# Patient Record
Sex: FEMALE | Race: WHITE | NOT HISPANIC OR LATINO | Employment: OTHER | URBAN - METROPOLITAN AREA
[De-identification: names, ages, dates, MRNs, and addresses within clinical notes are randomized per-mention and may not be internally consistent; named-entity substitution may affect disease eponyms.]

---

## 2019-08-03 ENCOUNTER — HOSPITAL ENCOUNTER (EMERGENCY)
Facility: MEDICAL CENTER | Age: 79
End: 2019-08-04
Attending: EMERGENCY MEDICINE
Payer: MEDICARE

## 2019-08-03 DIAGNOSIS — K52.9 GASTROENTERITIS: ICD-10-CM

## 2019-08-03 PROCEDURE — 99285 EMERGENCY DEPT VISIT HI MDM: CPT

## 2019-08-03 RX ORDER — SODIUM CHLORIDE 9 MG/ML
1000 INJECTION, SOLUTION INTRAVENOUS ONCE
Status: COMPLETED | OUTPATIENT
Start: 2019-08-04 | End: 2019-08-04

## 2019-08-03 RX ORDER — LOPERAMIDE HYDROCHLORIDE 2 MG/1
2 CAPSULE ORAL ONCE
Status: COMPLETED | OUTPATIENT
Start: 2019-08-04 | End: 2019-08-04

## 2019-08-03 RX ORDER — ONDANSETRON 2 MG/ML
4 INJECTION INTRAMUSCULAR; INTRAVENOUS ONCE
Status: COMPLETED | OUTPATIENT
Start: 2019-08-04 | End: 2019-08-04

## 2019-08-03 ASSESSMENT — ENCOUNTER SYMPTOMS
VOMITING: 1
DIARRHEA: 1
ABDOMINAL PAIN: 0
NAUSEA: 1

## 2019-08-04 ENCOUNTER — APPOINTMENT (OUTPATIENT)
Dept: RADIOLOGY | Facility: MEDICAL CENTER | Age: 79
End: 2019-08-04
Attending: EMERGENCY MEDICINE
Payer: MEDICARE

## 2019-08-04 VITALS
OXYGEN SATURATION: 95 % | RESPIRATION RATE: 20 BRPM | SYSTOLIC BLOOD PRESSURE: 163 MMHG | BODY MASS INDEX: 30.11 KG/M2 | HEIGHT: 64 IN | WEIGHT: 176.37 LBS | DIASTOLIC BLOOD PRESSURE: 68 MMHG | HEART RATE: 90 BPM | TEMPERATURE: 97 F

## 2019-08-04 LAB
ALBUMIN SERPL BCP-MCNC: 4.2 G/DL (ref 3.2–4.9)
ALBUMIN/GLOB SERPL: 1.4 G/DL
ALP SERPL-CCNC: 85 U/L (ref 30–99)
ALT SERPL-CCNC: 25 U/L (ref 2–50)
ANION GAP SERPL CALC-SCNC: 10 MMOL/L (ref 0–11.9)
AST SERPL-CCNC: 26 U/L (ref 12–45)
BASOPHILS # BLD AUTO: 0.4 % (ref 0–1.8)
BASOPHILS # BLD: 0.07 K/UL (ref 0–0.12)
BILIRUB SERPL-MCNC: 0.7 MG/DL (ref 0.1–1.5)
BUN SERPL-MCNC: 15 MG/DL (ref 8–22)
CALCIUM SERPL-MCNC: 9.7 MG/DL (ref 8.4–10.2)
CHLORIDE SERPL-SCNC: 104 MMOL/L (ref 96–112)
CO2 SERPL-SCNC: 21 MMOL/L (ref 20–33)
CREAT SERPL-MCNC: 1.04 MG/DL (ref 0.5–1.4)
EOSINOPHIL # BLD AUTO: 0.09 K/UL (ref 0–0.51)
EOSINOPHIL NFR BLD: 0.5 % (ref 0–6.9)
ERYTHROCYTE [DISTWIDTH] IN BLOOD BY AUTOMATED COUNT: 43.4 FL (ref 35.9–50)
GLOBULIN SER CALC-MCNC: 3 G/DL (ref 1.9–3.5)
GLUCOSE SERPL-MCNC: 143 MG/DL (ref 65–99)
HCT VFR BLD AUTO: 42.2 % (ref 37–47)
HGB BLD-MCNC: 14.4 G/DL (ref 12–16)
IMM GRANULOCYTES # BLD AUTO: 0.34 K/UL (ref 0–0.11)
IMM GRANULOCYTES NFR BLD AUTO: 1.7 % (ref 0–0.9)
LYMPHOCYTES # BLD AUTO: 1.51 K/UL (ref 1–4.8)
LYMPHOCYTES NFR BLD: 7.6 % (ref 22–41)
MCH RBC QN AUTO: 30.7 PG (ref 27–33)
MCHC RBC AUTO-ENTMCNC: 34.1 G/DL (ref 33.6–35)
MCV RBC AUTO: 90 FL (ref 81.4–97.8)
MONOCYTES # BLD AUTO: 0.6 K/UL (ref 0–0.85)
MONOCYTES NFR BLD AUTO: 3 % (ref 0–13.4)
NEUTROPHILS # BLD AUTO: 17.15 K/UL (ref 2–7.15)
NEUTROPHILS NFR BLD: 86.8 % (ref 44–72)
NRBC # BLD AUTO: 0 K/UL
NRBC BLD-RTO: 0 /100 WBC
PLATELET # BLD AUTO: 329 K/UL (ref 164–446)
PMV BLD AUTO: 9.1 FL (ref 9–12.9)
POTASSIUM SERPL-SCNC: 4.1 MMOL/L (ref 3.6–5.5)
PROT SERPL-MCNC: 7.2 G/DL (ref 6–8.2)
RBC # BLD AUTO: 4.69 M/UL (ref 4.2–5.4)
SODIUM SERPL-SCNC: 135 MMOL/L (ref 135–145)
WBC # BLD AUTO: 19.8 K/UL (ref 4.8–10.8)

## 2019-08-04 PROCEDURE — 74177 CT ABD & PELVIS W/CONTRAST: CPT

## 2019-08-04 PROCEDURE — 80053 COMPREHEN METABOLIC PANEL: CPT

## 2019-08-04 PROCEDURE — 700111 HCHG RX REV CODE 636 W/ 250 OVERRIDE (IP): Performed by: EMERGENCY MEDICINE

## 2019-08-04 PROCEDURE — 700105 HCHG RX REV CODE 258: Performed by: EMERGENCY MEDICINE

## 2019-08-04 PROCEDURE — 700117 HCHG RX CONTRAST REV CODE 255: Performed by: EMERGENCY MEDICINE

## 2019-08-04 PROCEDURE — 96374 THER/PROPH/DIAG INJ IV PUSH: CPT

## 2019-08-04 PROCEDURE — 700102 HCHG RX REV CODE 250 W/ 637 OVERRIDE(OP): Performed by: EMERGENCY MEDICINE

## 2019-08-04 PROCEDURE — 36415 COLL VENOUS BLD VENIPUNCTURE: CPT

## 2019-08-04 PROCEDURE — 85025 COMPLETE CBC W/AUTO DIFF WBC: CPT

## 2019-08-04 PROCEDURE — A9270 NON-COVERED ITEM OR SERVICE: HCPCS | Performed by: EMERGENCY MEDICINE

## 2019-08-04 RX ORDER — LACTOBACILLUS ACIDOPHILUS 500MM CELL
1 CAPSULE ORAL DAILY
Qty: 10 CAPSULE | Refills: 0 | Status: SHIPPED | OUTPATIENT
Start: 2019-08-04 | End: 2019-08-14

## 2019-08-04 RX ORDER — METRONIDAZOLE 500 MG/1
500 TABLET ORAL 3 TIMES DAILY
Qty: 21 TAB | Refills: 0 | Status: SHIPPED | OUTPATIENT
Start: 2019-08-04 | End: 2019-08-11

## 2019-08-04 RX ORDER — ONDANSETRON 4 MG/1
4 TABLET, ORALLY DISINTEGRATING ORAL ONCE
Qty: 10 TAB | Refills: 0 | Status: SHIPPED | OUTPATIENT
Start: 2019-08-04 | End: 2019-08-04 | Stop reason: SDUPTHER

## 2019-08-04 RX ORDER — METRONIDAZOLE 500 MG/1
500 TABLET ORAL ONCE
Status: COMPLETED | OUTPATIENT
Start: 2019-08-04 | End: 2019-08-04

## 2019-08-04 RX ORDER — ONDANSETRON 4 MG/1
4 TABLET, ORALLY DISINTEGRATING ORAL EVERY 6 HOURS PRN
Qty: 10 TAB | Refills: 0 | Status: SHIPPED | OUTPATIENT
Start: 2019-08-04

## 2019-08-04 RX ADMIN — ONDANSETRON 4 MG: 2 INJECTION INTRAMUSCULAR; INTRAVENOUS at 00:17

## 2019-08-04 RX ADMIN — IOHEXOL 100 ML: 350 INJECTION, SOLUTION INTRAVENOUS at 02:00

## 2019-08-04 RX ADMIN — SODIUM CHLORIDE 1000 ML: 9 INJECTION, SOLUTION INTRAVENOUS at 00:17

## 2019-08-04 RX ADMIN — LOPERAMIDE HYDROCHLORIDE 2 MG: 2 CAPSULE ORAL at 00:18

## 2019-08-04 RX ADMIN — METRONIDAZOLE 500 MG: 500 TABLET, FILM COATED ORAL at 02:11

## 2019-08-04 NOTE — ED TRIAGE NOTES
Patient BiB  for n/v/d since this evening. She states that everyone in her household has had the same symptoms

## 2019-08-04 NOTE — DISCHARGE INSTRUCTIONS
Your symptoms are most consistent with viral gastroenteritis.  Your white count here was significantly elevated and therefore we checked a CT which was normal.  If your symptoms worsen despite the medicine that we are sending you home with, you develop fever severe abdominal pain cannot stop vomiting, develop chest pain or shortness of breath or have any other concerns please return to the emergency department.

## 2019-08-04 NOTE — ED PROVIDER NOTES
ED Provider Note    CHIEF COMPLAINT  Chief Complaint   Patient presents with   • Nausea/Vomiting/Diarrhea       HPI  Raysa Almanza is a 79 y.o. female who presents with nausea vomiting diarrhea.  Patient reports that her son-in-law and daughter had similar symptoms the last few days.  Patient reports that symptoms developed around 8 PM tonight.  She denies any associated abdominal pain.  Emesis is nonbloody nonbilious.  Diarrhea is nonbloody without any melena.  Patient reports that she is having a bowel movement around every hour.  She denies any recent antibiotic use, any exposure to C. difficile, or any recent travel.  Patient denies any fevers.  She is not in the nursing home. No sob or CP    REVIEW OF SYSTEMS  Review of Systems   Cardiovascular: Negative for chest pain.   Gastrointestinal: Positive for diarrhea, nausea and vomiting. Negative for abdominal pain.   Genitourinary: Negative for dysuria and urgency.     See HPI for further details. All other systems are negative.     PAST MEDICAL HISTORY   has a past medical history of Chickenpox, CVA (cerebral vascular accident) (HCC), Diphtheria, HLD (hyperlipidemia), and Tonsillitis.    SOCIAL HISTORY  Social History     Tobacco Use   • Smoking status: Former Smoker     Packs/day: 0.50     Years: 10.00     Pack years: 5.00     Types: Cigarettes     Last attempt to quit: 1987     Years since quittin.6   • Smokeless tobacco: Never Used   Substance and Sexual Activity   • Alcohol use: Yes     Comment: wine every 6 months    • Drug use: No   • Sexual activity: Not on file       SURGICAL HISTORY  patient denies any surgical history    CURRENT MEDICATIONS  Home Medications    **Home medications have not yet been reviewed for this encounter**         ALLERGIES  Allergies   Allergen Reactions   • Penicillins        PHYSICAL EXAM  Physical Exam   Constitutional: She is oriented to person, place, and time. She appears well-developed and well-nourished.    HENT:   Head: Normocephalic and atraumatic.   Eyes: Conjunctivae are normal.   Neck: Normal range of motion. Neck supple.   Cardiovascular: Normal rate and regular rhythm.   Pulmonary/Chest: Effort normal and breath sounds normal.   Abdominal: Soft. Bowel sounds are normal. She exhibits no distension. There is no tenderness. There is no rebound.   Neurological: She is alert and oriented to person, place, and time.   Skin: Skin is warm and dry. No rash noted.   Psychiatric: She has a normal mood and affect. Her behavior is normal.     Labs  Results for orders placed or performed during the hospital encounter of 08/03/19   CBC WITH DIFFERENTIAL   Result Value Ref Range    WBC 19.8 (H) 4.8 - 10.8 K/uL    RBC 4.69 4.20 - 5.40 M/uL    Hemoglobin 14.4 12.0 - 16.0 g/dL    Hematocrit 42.2 37.0 - 47.0 %    MCV 90.0 81.4 - 97.8 fL    MCH 30.7 27.0 - 33.0 pg    MCHC 34.1 33.6 - 35.0 g/dL    RDW 43.4 35.9 - 50.0 fL    Platelet Count 329 164 - 446 K/uL    MPV 9.1 9.0 - 12.9 fL    Neutrophils-Polys 86.80 (H) 44.00 - 72.00 %    Lymphocytes 7.60 (L) 22.00 - 41.00 %    Monocytes 3.00 0.00 - 13.40 %    Eosinophils 0.50 0.00 - 6.90 %    Basophils 0.40 0.00 - 1.80 %    Immature Granulocytes 1.70 (H) 0.00 - 0.90 %    Nucleated RBC 0.00 /100 WBC    Neutrophils (Absolute) 17.15 (H) 2.00 - 7.15 K/uL    Lymphs (Absolute) 1.51 1.00 - 4.80 K/uL    Monos (Absolute) 0.60 0.00 - 0.85 K/uL    Eos (Absolute) 0.09 0.00 - 0.51 K/uL    Baso (Absolute) 0.07 0.00 - 0.12 K/uL    Immature Granulocytes (abs) 0.34 (H) 0.00 - 0.11 K/uL    NRBC (Absolute) 0.00 K/uL   CMP   Result Value Ref Range    Sodium 135 135 - 145 mmol/L    Potassium 4.1 3.6 - 5.5 mmol/L    Chloride 104 96 - 112 mmol/L    Co2 21 20 - 33 mmol/L    Anion Gap 10.0 0.0 - 11.9    Glucose 143 (H) 65 - 99 mg/dL    Bun 15 8 - 22 mg/dL    Creatinine 1.04 0.50 - 1.40 mg/dL    Calcium 9.7 8.4 - 10.2 mg/dL    AST(SGOT) 26 12 - 45 U/L    ALT(SGPT) 25 2 - 50 U/L    Alkaline Phosphatase 85 30 - 99  U/L    Total Bilirubin 0.7 0.1 - 1.5 mg/dL    Albumin 4.2 3.2 - 4.9 g/dL    Total Protein 7.2 6.0 - 8.2 g/dL    Globulin 3.0 1.9 - 3.5 g/dL    A-G Ratio 1.4 g/dL   ESTIMATED GFR   Result Value Ref Range    GFR If African American >60 >60 mL/min/1.73 m 2    GFR If Non  51 (A) >60 mL/min/1.73 m 2     Rads  CT-ABDOMEN-PELVIS WITH   Final Result      1.  No acute abdominal or pelvic findings.   2.  Two uncalcified nodules in the right lower lobe. Metastatic neoplasm would be a possibility.   3.  Colonic diverticulosis without diverticulitis.   4.  Hiatal hernia.            COURSE & MEDICAL DECISION MAKING  Pertinent Labs & Imaging studies reviewed. (See chart for details)  Patient here with chief complaint of nausea vomiting diarrhea without any abdominal tenderness or pain.  She has no associated chest pain shortness of breath to suggest cardiopulmonary diagnosis.  Check for electrolytes.  Given Zofran and loperamide for symptoms.  Patient without any immediate risk factors for C. Difficile.  Given my low clinical suspicion of this I have given a dose of loperamide.  Patient's white count is significantly elevated, given these findings to check CT for possible occult surgical pathology.  Patient CT is unremarkable without any obvious pathology.  Patient is feeling considerably better, she has not vomited since receiving Zofran in the emergency department.  Her diarrhea has resolved.  She denies any fevers or constitutional symptoms.  Her abdominal exam remains entirely benign.  I discussed admission versus discharge with patient, given that she appears so well I do believe that discharge and outpatient management is an acceptable course, I have offered admission for observation but given the patient feels considerably improved has a normal CT we have decided using shared decision-making to manage patient as an outpatient.  Will give a short course of Flagyl and Zofran.  Will cover also with  lactobacillus to ensure no abnormal bacterial overgrowth with the associated antibiotics.  I discussed return precautions in depth with both patient and her  and asked her to practice good hand hygiene and not handle the family's food until her symptoms have resolved.  The patient will return for worsening symptoms and is stable at the time of discharge. The patient verbalizes understanding and will comply.    FINAL IMPRESSION    1. Gastroenteritis               Electronically signed by: Steven Carvajal, 8/3/2019 11:43 PM

## 2019-08-04 NOTE — ED NOTES
Dc instructions and medications discussed with patient and  at bedside. All questions answered at this time. VSS. IV removed. Pt ambulated to lobby with steady gait.

## 2019-12-02 ENCOUNTER — HOSPITAL ENCOUNTER (EMERGENCY)
Facility: MEDICAL CENTER | Age: 79
End: 2019-12-02
Attending: EMERGENCY MEDICINE
Payer: MEDICARE

## 2019-12-02 VITALS
DIASTOLIC BLOOD PRESSURE: 78 MMHG | SYSTOLIC BLOOD PRESSURE: 144 MMHG | RESPIRATION RATE: 20 BRPM | HEART RATE: 79 BPM | HEIGHT: 64 IN | WEIGHT: 180.78 LBS | BODY MASS INDEX: 30.86 KG/M2 | TEMPERATURE: 98.1 F | OXYGEN SATURATION: 95 %

## 2019-12-02 DIAGNOSIS — T16.1XXA FOREIGN BODY OF RIGHT EAR, INITIAL ENCOUNTER: ICD-10-CM

## 2019-12-02 PROCEDURE — 99284 EMERGENCY DEPT VISIT MOD MDM: CPT

## 2019-12-02 PROCEDURE — 69200 CLEAR OUTER EAR CANAL: CPT

## 2019-12-02 RX ORDER — NEOMYCIN SULFATE, POLYMYXIN B SULFATE AND HYDROCORTISONE 10; 3.5; 1 MG/ML; MG/ML; [USP'U]/ML
4 SUSPENSION/ DROPS AURICULAR (OTIC) 4 TIMES DAILY
Qty: 8 ML | Refills: 0 | Status: SHIPPED | OUTPATIENT
Start: 2019-12-02 | End: 2019-12-07

## 2019-12-02 NOTE — ED PROVIDER NOTES
ED Provider Note    CHIEF COMPLAINT  Chief Complaint   Patient presents with   • Aspiration   • Ear Pain     foreign body (piece of hearing aide) in R ear for 2 days       HPI  Raysa Almanza is a 79 y.o. female who presents to the emergency department the chief complaint of a foreign body in the right ear.  2 days ago the patient says that the end of her hearing aid came off in her right ear.  She is been unable to get it out.  She went to the [facility] and they were unable to remove it for the patient to the emergency department.  She denies any ear pain.  No drainage.  No fevers.  No change in hearing.    REVIEW OF SYSTEMS  See HPI for further details. All other systems are negative.     PAST MEDICAL HISTORY  Past Medical History:   Diagnosis Date   • Chickenpox    • CVA (cerebral vascular accident) (HCC)    • Diphtheria    • HLD (hyperlipidemia)    • Tonsillitis        FAMILY HISTORY  Family History   Problem Relation Age of Onset   • Cancer Sister        SOCIAL HISTORY  Social History     Socioeconomic History   • Marital status:      Spouse name: Not on file   • Number of children: Not on file   • Years of education: Not on file   • Highest education level: Not on file   Occupational History   • Not on file   Social Needs   • Financial resource strain: Not on file   • Food insecurity:     Worry: Not on file     Inability: Not on file   • Transportation needs:     Medical: Not on file     Non-medical: Not on file   Tobacco Use   • Smoking status: Former Smoker     Packs/day: 0.50     Years: 10.00     Pack years: 5.00     Types: Cigarettes     Last attempt to quit: 1987     Years since quittin.9   • Smokeless tobacco: Never Used   Substance and Sexual Activity   • Alcohol use: Yes     Comment: wine every 6 months    • Drug use: No   • Sexual activity: Not on file   Lifestyle   • Physical activity:     Days per week: Not on file     Minutes per session: Not on file   • Stress: Not on  "file   Relationships   • Social connections:     Talks on phone: Not on file     Gets together: Not on file     Attends Judaism service: Not on file     Active member of club or organization: Not on file     Attends meetings of clubs or organizations: Not on file     Relationship status: Not on file   • Intimate partner violence:     Fear of current or ex partner: Not on file     Emotionally abused: Not on file     Physically abused: Not on file     Forced sexual activity: Not on file   Other Topics Concern   • Not on file   Social History Narrative   • Not on file       SURGICAL HISTORY  History reviewed. No pertinent surgical history.    CURRENT MEDICATIONS  Home Medications    **Home medications have not yet been reviewed for this encounter**         ALLERGIES  Allergies   Allergen Reactions   • Ciprofloxacin      thrush   • Penicillins        PHYSICAL EXAM  VITAL SIGNS: /78   Pulse 79   Temp 36.7 °C (98.1 °F)   Resp 20   Ht 1.626 m (5' 4\")   Wt 82 kg (180 lb 12.4 oz)   SpO2 95%   BMI 31.03 kg/m²   Constitutional:  Well developed, Well nourished, No acute distress, Non-toxic appearance.   HENT: Normal cephalic, atraumatic, there is a clear plastic piece visualized in the right external auditory canal.  Deep in the canal adjacent to the tympanic membrane.  Skin: Warm, Dry, No erythema, No rash.   Extremities: Intact distal pulses, No edema, No tenderness, No cyanosis, No clubbing.   Neurologic: Alert & oriented x 3, Normal motor function, Normal sensory function, No focal deficits noted.   Psychiatric: Affect normal, Judgment normal, Mood normal.     RADIOLOGY/PROCEDURES  Foreign Body Removal Procedure Note    Indication: Right ear foreign body    Procedure:  Local anesthesia over the foreign body site was not indicate.  The foreign body was then removed using mosquito forcep and had the appearance of plastic.  After the procedure wound dressing was not necessary.     The patient tolerated the " procedure well.    Complications: None          COURSE & MEDICAL DECISION MAKING  Pertinent Labs & Imaging studies reviewed. (See chart for details)    Patient presents today with a right ear foreign body.  This is removed as above.  Examination after removal demonstrated some erythema in the external auditory canal.  The patient will be treated with Cortisporin otic suspension to hopefully prevent any infection.  Patient is discharged home in improved condition.    The patient will return for new or worsening symptoms and is stable at the time of discharge.    The patient is referred to a primary physician for blood pressure management, diabetic screening, and for all other preventative health concerns.    DISPOSITION:  Patient will be discharged home in stable condition.    FOLLOW UP:  Nirali Gilbert A.P.R.NSantos  [address] [facility] [address]  [phone]    Schedule an appointment as soon as possible for a visit       [facility], Emergency Dept  [address] [facility] [id number] [phone]    If symptoms worsen      OUTPATIENT MEDICATIONS:  New Prescriptions    NEOMYCIN-POLYMYXIN-HC (PEDIOTIC HC) 3.5-07175-8 SUSPENSION    Place 4 Drops in ear 4 times a day for 5 days.         FINAL IMPRESSION  1. Foreign body of right ear, initial encounter            Electronically signed by: Yusuf Sherman, 12/2/2019 11:21 AM

## 2019-12-02 NOTE — ED TRIAGE NOTES
"Chief Complaint   Patient presents with   • Aspiration   • Ear Pain     foreign body (piece of hearing aide) in R ear for 2 days     /78   Pulse 79   Temp 36.7 °C (98.1 °F)   Resp 20   Ht 1.626 m (5' 4\")   Wt 82 kg (180 lb 12.4 oz)   SpO2 95%   BMI 31.03 kg/m²     "

## 2020-02-21 NOTE — ED NOTES
Notified patient of need for stool sample.    [>50% of Time Spent on Counseling for ____] : Greater than 50% of the encounter time was spent on counseling for [unfilled] [Time Spent: ___ minutes] : I have spent [unfilled] minutes of face to face time with the patient

## 2021-01-14 DIAGNOSIS — Z23 NEED FOR VACCINATION: ICD-10-CM

## 2021-06-01 ENCOUNTER — RECORDS - HEALTHEAST (OUTPATIENT)
Dept: ADMINISTRATIVE | Facility: CLINIC | Age: 81
End: 2021-06-01

## 2021-08-02 ENCOUNTER — HOSPITAL ENCOUNTER (OUTPATIENT)
Dept: RADIOLOGY | Facility: MEDICAL CENTER | Age: 81
End: 2021-08-02
Attending: NURSE PRACTITIONER
Payer: MEDICARE

## 2021-08-02 DIAGNOSIS — R05.9 COUGH: ICD-10-CM

## 2021-08-02 DIAGNOSIS — R12 HEARTBURN: ICD-10-CM

## 2021-08-02 PROCEDURE — 700117 HCHG RX CONTRAST REV CODE 255: Performed by: NURSE PRACTITIONER

## 2021-08-02 PROCEDURE — 74220 X-RAY XM ESOPHAGUS 1CNTRST: CPT

## 2021-08-02 RX ADMIN — BARIUM SULFATE 700 MG: 700 TABLET ORAL at 14:45

## 2023-02-03 ENCOUNTER — HOSPITAL ENCOUNTER (EMERGENCY)
Facility: MEDICAL CENTER | Age: 83
End: 2023-02-03
Attending: EMERGENCY MEDICINE
Payer: MEDICARE

## 2023-02-03 VITALS
OXYGEN SATURATION: 97 % | WEIGHT: 186.73 LBS | BODY MASS INDEX: 31.88 KG/M2 | RESPIRATION RATE: 18 BRPM | TEMPERATURE: 97.3 F | HEART RATE: 62 BPM | HEIGHT: 64 IN | DIASTOLIC BLOOD PRESSURE: 77 MMHG | SYSTOLIC BLOOD PRESSURE: 142 MMHG

## 2023-02-03 DIAGNOSIS — T16.2XXA FOREIGN BODY OF LEFT EAR, INITIAL ENCOUNTER: ICD-10-CM

## 2023-02-03 PROCEDURE — 99284 EMERGENCY DEPT VISIT MOD MDM: CPT

## 2023-02-03 NOTE — ED PROVIDER NOTES
"ED Provider Note    CHIEF COMPLAINT  Chief Complaint   Patient presents with    Other     Hearing aid tip is stuck in L ear          HPI/ROS      Raysa Almanza is a 82 y.o. female who presents for concern of a hearing aid piece stuck in her ear.  She says it came off of her hearing aid and she thinks it is inside the ear.  No pain or drainage noted.    PAST MEDICAL HISTORY   has a past medical history of Chickenpox, CVA (cerebral vascular accident) (HCC), Diphtheria, HLD (hyperlipidemia), and Tonsillitis.    SURGICAL HISTORY  patient denies any surgical history    FAMILY HISTORY  Family History   Problem Relation Age of Onset    Cancer Sister        SOCIAL HISTORY  Social History     Tobacco Use    Smoking status: Former     Packs/day: 0.50     Years: 10.00     Pack years: 5.00     Types: Cigarettes     Quit date: 1987     Years since quittin.1    Smokeless tobacco: Never   Substance and Sexual Activity    Alcohol use: Yes     Comment: wine every 6 months     Drug use: No    Sexual activity: Not on file       CURRENT MEDICATIONS  Home Medications       Reviewed by Deirdre Sorto R.N. (Registered Nurse) on 23 at 1315  Med List Status: Partial     Medication Last Dose Status   albuterol (PROAIR HFA) 108 (90 BASE) MCG/ACT Aero Soln inhalation aerosol  Active   albuterol (PROVENTIL) 2.5mg/3ml Nebu Soln solution for nebulization  Active   Ibuprofen 200 MG Cap  Active   ondansetron (ZOFRAN ODT) 4 MG TABLET DISPERSIBLE  Active   oxybutynin (DITROPAN) 5 MG Tab  Active   potassium chloride ER (KLOR-CON) 10 MEQ tablet  Active   simvastatin (ZOCOR) 10 MG Tab  Active   spironolactone (ALDACTONE) 25 MG Tab  Active                    ALLERGIES  Allergies   Allergen Reactions    Ciprofloxacin      thrush    Penicillins        PHYSICAL EXAM  VITAL SIGNS: BP (!) 142/77   Pulse 62   Temp 36.3 °C (97.3 °F) (Temporal)   Resp 18   Ht 1.626 m (5' 4\")   Wt 84.7 kg (186 lb 11.7 oz)   SpO2 97%   BMI 32.05 " kg/m²    Constitutional: Alert in no apparent distress. Well apearing  HENT: Normocephalic, Atraumatic, Bilateral external ears normal. Nose normal.  TMs clear bilaterally no obvious foreign body seen in the left ear.  Eyes:  Conjunctiva normal, non-icteric.   Lungs: Non-labored respirations  Skin: Warm, Dry, No erythema, No rash.   Neurologic: Alert, Grossly non-focal.   Psychiatric: Affect normal, Judgment normal, Mood normal, Appears appropriate and not intoxicated.           COURSE & MEDICAL DECISION MAKING    ED Observation Status? No; Patient does not meet criteria for ED Observation.     INITIAL ASSESSMENT, COURSE AND PLAN  Care Narrative: This an 82-year-old female that presents concerned about a foreign body in her left ear.  On exam I do not appreciate any obvious foreign body.  The ear was irrigated to ensure there was no foreign body and on reevaluation there was still no foreign body present.  Given this I do think she can be discharged if she has further issues she can return for reevaluation.  She is agreeable to this plan.     The patient will return for new or worsening symptoms and is stable at the time of discharge. Patient was given return precautions. Patient and/or family member verbalizes understanding and will comply.    DISPOSITION:  Patient will be discharged home in stable condition.    FOLLOW UP:  WILLIAM Kumari  [address] [facility] [address]  [facility] [address] [phone]      As needed    [facility], Emergency Dept  [address]  [facility] [address] [phone]    Return for worsening pain, drainage or other concerns      OUTPATIENT MEDICATIONS:  New Prescriptions    No medications on file         FINAL DIAGNOSIS  1. Foreign body of left ear, initial encounter           Electronically signed by: Winsome Morales M.D., 2/3/2023 1:36 PM

## 2023-02-03 NOTE — ED TRIAGE NOTES
Pt comes in c/o hearing aid tip stuck in L ear  noticed it this morning when she went to put hearing aid in  has Hx of the same

## 2023-03-04 ENCOUNTER — HOSPITAL ENCOUNTER (EMERGENCY)
Facility: MEDICAL CENTER | Age: 83
End: 2023-03-04
Payer: MEDICARE

## 2023-03-04 VITALS
SYSTOLIC BLOOD PRESSURE: 144 MMHG | TEMPERATURE: 96.7 F | RESPIRATION RATE: 16 BRPM | DIASTOLIC BLOOD PRESSURE: 50 MMHG | HEART RATE: 72 BPM | OXYGEN SATURATION: 95 %

## 2023-03-04 PROCEDURE — 302449 STATCHG TRIAGE ONLY (STATISTIC)

## 2024-11-27 ASSESSMENT — ENCOUNTER SYMPTOMS
RESPIRATORY SYMPTOMS COMMENTS: YES
WHEEZING: 1
HEMOPTYSIS: 0
DYSPNEA AT REST: 0
SHORTNESS OF BREATH: 1
CHEST TIGHTNESS: 0

## 2024-12-03 ENCOUNTER — HOSPITAL ENCOUNTER (OUTPATIENT)
Dept: RADIOLOGY | Facility: MEDICAL CENTER | Age: 84
End: 2024-12-03
Attending: STUDENT IN AN ORGANIZED HEALTH CARE EDUCATION/TRAINING PROGRAM

## 2024-12-03 ENCOUNTER — OFFICE VISIT (OUTPATIENT)
Dept: SLEEP MEDICINE | Facility: MEDICAL CENTER | Age: 84
End: 2024-12-03
Attending: INTERNAL MEDICINE
Payer: MEDICARE

## 2024-12-03 VITALS
OXYGEN SATURATION: 96 % | BODY MASS INDEX: 29.88 KG/M2 | HEART RATE: 61 BPM | HEIGHT: 64 IN | SYSTOLIC BLOOD PRESSURE: 118 MMHG | DIASTOLIC BLOOD PRESSURE: 72 MMHG | WEIGHT: 175 LBS

## 2024-12-03 DIAGNOSIS — K21.9 GASTROESOPHAGEAL REFLUX DISEASE, UNSPECIFIED WHETHER ESOPHAGITIS PRESENT: ICD-10-CM

## 2024-12-03 DIAGNOSIS — R93.89 ABNORMAL CT OF THE CHEST: ICD-10-CM

## 2024-12-03 PROCEDURE — 99214 OFFICE O/P EST MOD 30 MIN: CPT | Performed by: INTERNAL MEDICINE

## 2024-12-03 PROCEDURE — 3074F SYST BP LT 130 MM HG: CPT | Performed by: INTERNAL MEDICINE

## 2024-12-03 PROCEDURE — 3078F DIAST BP <80 MM HG: CPT | Performed by: INTERNAL MEDICINE

## 2024-12-03 PROCEDURE — 99204 OFFICE O/P NEW MOD 45 MIN: CPT | Performed by: INTERNAL MEDICINE

## 2024-12-03 ASSESSMENT — ENCOUNTER SYMPTOMS
PHOTOPHOBIA: 0
WEAKNESS: 0
EYE REDNESS: 0
HEARTBURN: 1
SPEECH CHANGE: 0
VOMITING: 0
HEADACHES: 0
BLURRED VISION: 0
CONSTIPATION: 0
WEIGHT LOSS: 0
DOUBLE VISION: 0
CHILLS: 0
BACK PAIN: 0
SPUTUM PRODUCTION: 0
PALPITATIONS: 0
NECK PAIN: 0
SORE THROAT: 0
SHORTNESS OF BREATH: 1
CLAUDICATION: 0
STRIDOR: 0
WHEEZING: 1
EYE PAIN: 0
DIARRHEA: 0
NAUSEA: 0
PND: 0
ORTHOPNEA: 0
MYALGIAS: 0
SINUS PAIN: 0
TREMORS: 0
FOCAL WEAKNESS: 0
HEMOPTYSIS: 0
FALLS: 0
FEVER: 0
COUGH: 1
DEPRESSION: 0
EYE DISCHARGE: 0
DIZZINESS: 0
DIAPHORESIS: 0
ABDOMINAL PAIN: 0

## 2024-12-03 NOTE — PROGRESS NOTES
Chief Complaint   Patient presents with    New Patient     REF BY JOSEFINA WHITNEY FOR LUNG MASS, COUGH    Results     CT CHEST 11/11/24        HPI: This patient is a 84 y.o. female presenting for evaluation of abnormal CT chest. PMHx is significant for a remote hx of breast ca s/p surgical resection and chemotherapy but no XRT in 1989 per pt, bladder surgery for prolapse and GERD since her 20s. She is a former tobacco smoker with <5 pk year hx and quit in 1989. Sister suffered from breast Ca. No family hx of autoimmune disease. Pt does not drink ETOH. She denies chronic respiratory issues other than positional cough at night related to GERD. She was hospitalized once in 7/2016 for 3 days with a fairly diffuse PNA based on imaging from that time while vacationing with family in [place]. She was seen by Dr. Arriaza in October for f/u and a f/u CXR from 10/2016 was read as clear however there is an obvious abnormality in the NAHOMY and elevated R hemidiaphragm somewhat obscuring evaluation of the RLL. She later had a CT abdomen in 8/2019 on which there are clearly nodular opacities in the RLL that were never followed up. Most recently she was seen by PCP for cough and wheezing which improved with doxycycline and she denies any ongoing wheezing or cough outside of her nocturnal sxs. No f/c. No nt sweats or wt loss. CT ordered by her PCP from 11/11 showed nodular densities in RUL, NAHOMY and RLL most of which were present on CT from 7/2016 although some are new and certainly increased in size from 2016 and from visible areas on CT abd from 8/2019. She has some calcified LAD but not particularly enlarged.     Past Medical History:   Diagnosis Date    Chickenpox     CVA (cerebral vascular accident) (HCC)     Diphtheria     HLD (hyperlipidemia)     Tonsillitis        Social History     Socioeconomic History    Marital status:      Spouse name: Not on file    Number of children: Not on file    Years of education: Not on file     Highest education level: Not on file   Occupational History    Not on file   Tobacco Use    Smoking status: Former     Current packs/day: 0.00     Average packs/day: 0.5 packs/day for 10.0 years (5.0 ttl pk-yrs)     Types: Cigarettes     Start date: 1977     Quit date: 1987     Years since quittin.9    Smokeless tobacco: Never   Substance and Sexual Activity    Alcohol use: Yes     Comment: wine every 6 months     Drug use: No    Sexual activity: Not on file   Other Topics Concern    Not on file   Social History Narrative    Not on file     Social Drivers of Health     Financial Resource Strain: Not on file   Food Insecurity: Not on file   Transportation Needs: Not on file   Physical Activity: Not on file   Stress: Not on file   Social Connections: Not on file   Intimate Partner Violence: Not on file   Housing Stability: Not on file       Family History   Problem Relation Age of Onset    Cancer Sister        Current Outpatient Medications on File Prior to Visit   Medication Sig Dispense Refill    Ibuprofen 200 MG Cap Take  by mouth.      oxybutynin (DITROPAN) 5 MG Tab Take 5 mg by mouth 3 times a day.      potassium chloride ER (KLOR-CON) 10 MEQ tablet Take 10 mEq by mouth 2 times a day.      spironolactone (ALDACTONE) 25 MG Tab Take 25 mg by mouth every day.      albuterol (PROVENTIL) 2.5mg/3ml Nebu Soln solution for nebulization 2.5 mg by Nebulization route every four hours as needed for Shortness of Breath.      ondansetron (ZOFRAN ODT) 4 MG TABLET DISPERSIBLE Take 1 Tab by mouth every 6 hours as needed for Nausea. 10 Tab 0    albuterol (PROAIR HFA) 108 (90 BASE) MCG/ACT Aero Soln inhalation aerosol Inhale 2 Puffs by mouth every 6 hours as needed for Shortness of Breath.      simvastatin (ZOCOR) 10 MG Tab Take 10 mg by mouth every evening.       No current facility-administered medications on file prior to visit.       Allergies: Ciprofloxacin and Penicillins    ROS:   Review of Systems  "  Constitutional:  Negative for chills, diaphoresis, fever, malaise/fatigue and weight loss.   HENT:  Negative for congestion, ear discharge, ear pain, hearing loss, nosebleeds, sinus pain, sore throat and tinnitus.    Eyes:  Negative for blurred vision, double vision, photophobia, pain, discharge and redness.   Respiratory:  Positive for cough, shortness of breath and wheezing. Negative for hemoptysis, sputum production and stridor.    Cardiovascular:  Negative for chest pain, palpitations, orthopnea, claudication, leg swelling and PND.   Gastrointestinal:  Positive for heartburn. Negative for abdominal pain, constipation, diarrhea, nausea and vomiting.   Genitourinary:  Negative for dysuria and urgency.   Musculoskeletal:  Negative for back pain, falls, joint pain, myalgias and neck pain.   Skin:  Negative for itching and rash.   Neurological:  Negative for dizziness, tremors, speech change, focal weakness, weakness and headaches.   Endo/Heme/Allergies:  Negative for environmental allergies.   Psychiatric/Behavioral:  Negative for depression.        /72 (BP Location: Right arm, Patient Position: Sitting, BP Cuff Size: Adult)   Pulse 61   Ht 1.626 m (5' 4\")   Wt 79.4 kg (175 lb)   SpO2 96%     Physical Exam:  Physical Exam  Constitutional:       General: She is not in acute distress.     Appearance: Normal appearance. She is well-developed and normal weight.   HENT:      Head: Normocephalic and atraumatic.      Right Ear: External ear normal.      Left Ear: External ear normal.      Nose: Nose normal. No congestion.      Mouth/Throat:      Mouth: Mucous membranes are moist.      Pharynx: Oropharynx is clear. No oropharyngeal exudate.   Eyes:      General: No scleral icterus.     Extraocular Movements: Extraocular movements intact.      Conjunctiva/sclera: Conjunctivae normal.      Pupils: Pupils are equal, round, and reactive to light.   Neck:      Vascular: No JVD.      Trachea: No tracheal deviation. "   Cardiovascular:      Rate and Rhythm: Normal rate and regular rhythm.      Heart sounds: Normal heart sounds. No murmur heard.     No friction rub. No gallop.   Pulmonary:      Effort: Pulmonary effort is normal. No accessory muscle usage or respiratory distress.      Breath sounds: Normal breath sounds. No wheezing or rales.   Abdominal:      General: There is no distension.      Palpations: Abdomen is soft.      Tenderness: There is no abdominal tenderness.   Musculoskeletal:         General: No tenderness or deformity. Normal range of motion.      Cervical back: Normal range of motion and neck supple.      Right lower leg: No edema.      Left lower leg: No edema.   Lymphadenopathy:      Cervical: No cervical adenopathy.   Skin:     General: Skin is warm and dry.      Findings: No rash.      Nails: There is no clubbing.   Neurological:      Mental Status: She is alert and oriented to person, place, and time.      Cranial Nerves: No cranial nerve deficit.      Gait: Gait normal.   Psychiatric:         Behavior: Behavior normal.         PFTs as reviewed by me personally: none    Imaging as reviewed by me personally:as per hPI    Assessment:  1. Abnormal CT of the chest  Bronchoscopy    CT-CHEST (THORAX) W/O      2. Gastroesophageal reflux disease, unspecified whether esophagitis present            Plan:  1. This is chronic with most abnormalities present in 2016 but increasing in size and some new areas. Seems atypical for cancer given multifocal nature but also atypical for gERD w/aspiration. Chronic infx remains in the ddx as well. She is scheduled for PET at the end of this month and I am recommending diagnostic navigational bronchoscopy which pt is agreeable to.   2. She is already following lifestyle modifications to minimize mechanical reflux and I am not sure we could explain imaging abnormalities with GERd alone. See above.   Return in about 4 weeks (around 12/31/2024) for bronchoscopy results .

## 2024-12-04 ENCOUNTER — APPOINTMENT (OUTPATIENT)
Dept: ADMISSIONS | Facility: MEDICAL CENTER | Age: 84
End: 2024-12-04
Attending: INTERNAL MEDICINE
Payer: MEDICARE

## 2024-12-09 ENCOUNTER — PRE-ADMISSION TESTING (OUTPATIENT)
Dept: ADMISSIONS | Facility: MEDICAL CENTER | Age: 84
End: 2024-12-09
Attending: INTERNAL MEDICINE
Payer: MEDICARE

## 2024-12-09 VITALS — BODY MASS INDEX: 29.33 KG/M2 | WEIGHT: 171.8 LBS | HEIGHT: 64 IN

## 2024-12-09 DIAGNOSIS — Z01.810 PRE-OPERATIVE CARDIOVASCULAR EXAMINATION: ICD-10-CM

## 2024-12-09 DIAGNOSIS — Z01.812 PRE-OPERATIVE LABORATORY EXAMINATION: ICD-10-CM

## 2024-12-09 LAB
ANION GAP SERPL CALC-SCNC: 12 MMOL/L (ref 7–16)
BUN SERPL-MCNC: 12 MG/DL (ref 8–22)
CALCIUM SERPL-MCNC: 9.6 MG/DL (ref 8.4–10.2)
CHLORIDE SERPL-SCNC: 101 MMOL/L (ref 96–112)
CO2 SERPL-SCNC: 21 MMOL/L (ref 20–33)
CREAT SERPL-MCNC: 0.7 MG/DL (ref 0.5–1.4)
GFR SERPLBLD CREATININE-BSD FMLA CKD-EPI: 84 ML/MIN/1.73 M 2
GLUCOSE SERPL-MCNC: 100 MG/DL (ref 65–99)
POTASSIUM SERPL-SCNC: 4.1 MMOL/L (ref 3.6–5.5)
SODIUM SERPL-SCNC: 134 MMOL/L (ref 135–145)

## 2024-12-09 PROCEDURE — 93005 ELECTROCARDIOGRAM TRACING: CPT | Mod: TC

## 2024-12-09 PROCEDURE — 80048 BASIC METABOLIC PNL TOTAL CA: CPT

## 2024-12-09 PROCEDURE — 36415 COLL VENOUS BLD VENIPUNCTURE: CPT

## 2024-12-09 RX ORDER — ATENOLOL 50 MG/1
25 TABLET ORAL 2 TIMES DAILY
COMMUNITY

## 2024-12-09 RX ORDER — GABAPENTIN 100 MG/1
100 CAPSULE ORAL 3 TIMES DAILY PRN
COMMUNITY

## 2024-12-09 RX ORDER — LEVOTHYROXINE SODIUM 25 UG/1
1.5 TABLET ORAL
COMMUNITY

## 2024-12-09 NOTE — PREPROCEDURE INSTRUCTIONS
PreAdmit In person Appointment: Reviewed the Preparing for your procedure handout with patient. Patient instructed per pharmacy guidelines regarding taking, holding or contacting provider for instructions on regularly prescribed medications before surgery.     Confirmed with patient where to check in morning of surgery. Handouts/Brochures given to patient.    Testing appointment today 12/9.

## 2024-12-10 LAB — EKG IMPRESSION: NORMAL

## 2024-12-10 PROCEDURE — 93010 ELECTROCARDIOGRAM REPORT: CPT | Performed by: INTERNAL MEDICINE

## 2024-12-18 RX ORDER — LORATADINE 10 MG/1
10 TABLET ORAL DAILY
COMMUNITY

## 2024-12-19 ENCOUNTER — ANESTHESIA EVENT (OUTPATIENT)
Dept: SURGERY | Facility: MEDICAL CENTER | Age: 84
End: 2024-12-19
Payer: MEDICARE

## 2024-12-19 ENCOUNTER — APPOINTMENT (OUTPATIENT)
Dept: RADIOLOGY | Facility: MEDICAL CENTER | Age: 84
End: 2024-12-19
Attending: INTERNAL MEDICINE
Payer: MEDICARE

## 2024-12-19 ENCOUNTER — ANESTHESIA (OUTPATIENT)
Dept: SURGERY | Facility: MEDICAL CENTER | Age: 84
End: 2024-12-19
Payer: MEDICARE

## 2024-12-19 ENCOUNTER — HOSPITAL ENCOUNTER (OUTPATIENT)
Facility: MEDICAL CENTER | Age: 84
End: 2024-12-19
Attending: INTERNAL MEDICINE | Admitting: INTERNAL MEDICINE
Payer: MEDICARE

## 2024-12-19 VITALS
WEIGHT: 175.93 LBS | DIASTOLIC BLOOD PRESSURE: 63 MMHG | OXYGEN SATURATION: 98 % | SYSTOLIC BLOOD PRESSURE: 140 MMHG | BODY MASS INDEX: 30.2 KG/M2 | RESPIRATION RATE: 16 BRPM | HEART RATE: 69 BPM | TEMPERATURE: 97.3 F

## 2024-12-19 DIAGNOSIS — R93.89 ABNORMAL CT OF THE CHEST: ICD-10-CM

## 2024-12-19 LAB
GRAM STN SPEC: NORMAL
GRAM STN SPEC: NORMAL
PATHOLOGY CONSULT NOTE: NORMAL
SIGNIFICANT IND 70042: NORMAL
SIGNIFICANT IND 70042: NORMAL
SITE SITE: NORMAL
SITE SITE: NORMAL
SOURCE SOURCE: NORMAL
SOURCE SOURCE: NORMAL

## 2024-12-19 PROCEDURE — 31654 BRONCH EBUS IVNTJ PERPH LES: CPT | Performed by: INTERNAL MEDICINE

## 2024-12-19 PROCEDURE — 31624 DX BRONCHOSCOPE/LAVAGE: CPT | Performed by: INTERNAL MEDICINE

## 2024-12-19 PROCEDURE — 502714 HCHG ROBOTIC SURGERY SERVICES: Performed by: INTERNAL MEDICINE

## 2024-12-19 PROCEDURE — 160048 HCHG OR STATISTICAL LEVEL 1-5: Performed by: INTERNAL MEDICINE

## 2024-12-19 PROCEDURE — 87070 CULTURE OTHR SPECIMN AEROBIC: CPT | Mod: 91

## 2024-12-19 PROCEDURE — 160046 HCHG PACU - 1ST 60 MINS PHASE II: Performed by: INTERNAL MEDICINE

## 2024-12-19 PROCEDURE — 88112 CYTOPATH CELL ENHANCE TECH: CPT

## 2024-12-19 PROCEDURE — 160025 RECOVERY II MINUTES (STATS): Performed by: INTERNAL MEDICINE

## 2024-12-19 PROCEDURE — C1889 IMPLANT/INSERT DEVICE, NOC: HCPCS | Performed by: INTERNAL MEDICINE

## 2024-12-19 PROCEDURE — 71250 CT THORAX DX C-: CPT

## 2024-12-19 PROCEDURE — 88305 TISSUE EXAM BY PATHOLOGIST: CPT | Mod: 59

## 2024-12-19 PROCEDURE — 160035 HCHG PACU - 1ST 60 MINS PHASE I: Performed by: INTERNAL MEDICINE

## 2024-12-19 PROCEDURE — 700111 HCHG RX REV CODE 636 W/ 250 OVERRIDE (IP): Performed by: ANESTHESIOLOGY

## 2024-12-19 PROCEDURE — 700101 HCHG RX REV CODE 250: Performed by: INTERNAL MEDICINE

## 2024-12-19 PROCEDURE — 71045 X-RAY EXAM CHEST 1 VIEW: CPT

## 2024-12-19 PROCEDURE — 88333 PATH CONSLTJ SURG CYTO XM 1: CPT

## 2024-12-19 PROCEDURE — 88313 SPECIAL STAINS GROUP 2: CPT

## 2024-12-19 PROCEDURE — 160002 HCHG RECOVERY MINUTES (STAT): Performed by: INTERNAL MEDICINE

## 2024-12-19 PROCEDURE — 31629 BRONCHOSCOPY/NEEDLE BX EACH: CPT | Performed by: INTERNAL MEDICINE

## 2024-12-19 PROCEDURE — 160031 HCHG SURGERY MINUTES - 1ST 30 MINS LEVEL 5: Performed by: INTERNAL MEDICINE

## 2024-12-19 PROCEDURE — 160036 HCHG PACU - EA ADDL 30 MINS PHASE I: Performed by: INTERNAL MEDICINE

## 2024-12-19 PROCEDURE — C1887 CATHETER, GUIDING: HCPCS | Performed by: INTERNAL MEDICINE

## 2024-12-19 PROCEDURE — 160009 HCHG ANES TIME/MIN: Performed by: INTERNAL MEDICINE

## 2024-12-19 PROCEDURE — 700101 HCHG RX REV CODE 250: Performed by: ANESTHESIOLOGY

## 2024-12-19 PROCEDURE — 700105 HCHG RX REV CODE 258: Performed by: INTERNAL MEDICINE

## 2024-12-19 PROCEDURE — 31628 BRONCHOSCOPY/LUNG BX EACH: CPT | Performed by: INTERNAL MEDICINE

## 2024-12-19 PROCEDURE — 87205 SMEAR GRAM STAIN: CPT

## 2024-12-19 PROCEDURE — 160042 HCHG SURGERY MINUTES - EA ADDL 1 MIN LEVEL 5: Performed by: INTERNAL MEDICINE

## 2024-12-19 RX ORDER — SODIUM CHLORIDE, SODIUM LACTATE, POTASSIUM CHLORIDE, CALCIUM CHLORIDE 600; 310; 30; 20 MG/100ML; MG/100ML; MG/100ML; MG/100ML
INJECTION, SOLUTION INTRAVENOUS CONTINUOUS
Status: ACTIVE | OUTPATIENT
Start: 2024-12-19 | End: 2024-12-19

## 2024-12-19 RX ORDER — LIDOCAINE HYDROCHLORIDE 20 MG/ML
INJECTION, SOLUTION EPIDURAL; INFILTRATION; INTRACAUDAL; PERINEURAL PRN
Status: DISCONTINUED | OUTPATIENT
Start: 2024-12-19 | End: 2024-12-19 | Stop reason: SURG

## 2024-12-19 RX ORDER — DEXAMETHASONE SODIUM PHOSPHATE 4 MG/ML
INJECTION, SOLUTION INTRA-ARTICULAR; INTRALESIONAL; INTRAMUSCULAR; INTRAVENOUS; SOFT TISSUE PRN
Status: DISCONTINUED | OUTPATIENT
Start: 2024-12-19 | End: 2024-12-19 | Stop reason: SURG

## 2024-12-19 RX ORDER — ONDANSETRON 2 MG/ML
INJECTION INTRAMUSCULAR; INTRAVENOUS PRN
Status: DISCONTINUED | OUTPATIENT
Start: 2024-12-19 | End: 2024-12-19 | Stop reason: SURG

## 2024-12-19 RX ORDER — OXYCODONE HCL 5 MG/5 ML
10 SOLUTION, ORAL ORAL
Status: DISCONTINUED | OUTPATIENT
Start: 2024-12-19 | End: 2024-12-19 | Stop reason: HOSPADM

## 2024-12-19 RX ORDER — EPHEDRINE SULFATE 50 MG/ML
INJECTION, SOLUTION INTRAVENOUS PRN
Status: DISCONTINUED | OUTPATIENT
Start: 2024-12-19 | End: 2024-12-19 | Stop reason: SURG

## 2024-12-19 RX ORDER — HYDROMORPHONE HYDROCHLORIDE 1 MG/ML
0.1 INJECTION, SOLUTION INTRAMUSCULAR; INTRAVENOUS; SUBCUTANEOUS
Status: DISCONTINUED | OUTPATIENT
Start: 2024-12-19 | End: 2024-12-19 | Stop reason: HOSPADM

## 2024-12-19 RX ORDER — MEPERIDINE HYDROCHLORIDE 25 MG/ML
6.25 INJECTION INTRAMUSCULAR; INTRAVENOUS; SUBCUTANEOUS
Status: DISCONTINUED | OUTPATIENT
Start: 2024-12-19 | End: 2024-12-19 | Stop reason: HOSPADM

## 2024-12-19 RX ORDER — HYDROMORPHONE HYDROCHLORIDE 1 MG/ML
0.4 INJECTION, SOLUTION INTRAMUSCULAR; INTRAVENOUS; SUBCUTANEOUS
Status: DISCONTINUED | OUTPATIENT
Start: 2024-12-19 | End: 2024-12-19 | Stop reason: HOSPADM

## 2024-12-19 RX ORDER — ONDANSETRON 2 MG/ML
4 INJECTION INTRAMUSCULAR; INTRAVENOUS
Status: DISCONTINUED | OUTPATIENT
Start: 2024-12-19 | End: 2024-12-19 | Stop reason: HOSPADM

## 2024-12-19 RX ORDER — DIPHENHYDRAMINE HYDROCHLORIDE 50 MG/ML
12.5 INJECTION INTRAMUSCULAR; INTRAVENOUS
Status: DISCONTINUED | OUTPATIENT
Start: 2024-12-19 | End: 2024-12-19 | Stop reason: HOSPADM

## 2024-12-19 RX ORDER — OXYCODONE HCL 5 MG/5 ML
5 SOLUTION, ORAL ORAL
Status: DISCONTINUED | OUTPATIENT
Start: 2024-12-19 | End: 2024-12-19 | Stop reason: HOSPADM

## 2024-12-19 RX ORDER — HALOPERIDOL 5 MG/ML
1 INJECTION INTRAMUSCULAR
Status: DISCONTINUED | OUTPATIENT
Start: 2024-12-19 | End: 2024-12-19 | Stop reason: HOSPADM

## 2024-12-19 RX ORDER — HYDROMORPHONE HYDROCHLORIDE 1 MG/ML
0.2 INJECTION, SOLUTION INTRAMUSCULAR; INTRAVENOUS; SUBCUTANEOUS
Status: DISCONTINUED | OUTPATIENT
Start: 2024-12-19 | End: 2024-12-19 | Stop reason: HOSPADM

## 2024-12-19 RX ADMIN — ROCURONIUM BROMIDE 40 MG: 10 INJECTION, SOLUTION INTRAVENOUS at 12:55

## 2024-12-19 RX ADMIN — SODIUM CHLORIDE, SODIUM LACTATE, POTASSIUM CHLORIDE, AND CALCIUM CHLORIDE: .6; .31; .03; .02 INJECTION, SOLUTION INTRAVENOUS at 10:55

## 2024-12-19 RX ADMIN — ONDANSETRON 4 MG: 2 INJECTION INTRAMUSCULAR; INTRAVENOUS at 14:04

## 2024-12-19 RX ADMIN — FENTANYL CITRATE 50 MCG: 50 INJECTION, SOLUTION INTRAMUSCULAR; INTRAVENOUS at 12:54

## 2024-12-19 RX ADMIN — LIDOCAINE HYDROCHLORIDE 20 MG: 20 INJECTION, SOLUTION EPIDURAL; INFILTRATION; INTRACAUDAL; PERINEURAL at 12:54

## 2024-12-19 RX ADMIN — SUGAMMADEX 200 MG: 100 INJECTION, SOLUTION INTRAVENOUS at 14:02

## 2024-12-19 RX ADMIN — GLYCOPYRROLATE 0.2 MG: 0.2 INJECTION INTRAMUSCULAR; INTRAVENOUS at 13:20

## 2024-12-19 RX ADMIN — EPHEDRINE SULFATE 10 MG: 50 INJECTION, SOLUTION INTRAVENOUS at 13:20

## 2024-12-19 RX ADMIN — PROPOFOL 150 MG: 10 INJECTION, EMULSION INTRAVENOUS at 12:54

## 2024-12-19 RX ADMIN — DEXAMETHASONE SODIUM PHOSPHATE 4 MG: 4 INJECTION INTRA-ARTICULAR; INTRALESIONAL; INTRAMUSCULAR; INTRAVENOUS; SOFT TISSUE at 13:04

## 2024-12-19 RX ADMIN — FENTANYL CITRATE 50 MCG: 50 INJECTION, SOLUTION INTRAMUSCULAR; INTRAVENOUS at 14:03

## 2024-12-19 RX ADMIN — ROCURONIUM BROMIDE 10 MG: 10 INJECTION, SOLUTION INTRAVENOUS at 13:45

## 2024-12-19 RX ADMIN — LIDOCAINE HYDROCHLORIDE 0.5 ML: 10 SOLUTION INTRAVENOUS at 10:54

## 2024-12-19 ASSESSMENT — PAIN DESCRIPTION - PAIN TYPE
TYPE: ACUTE PAIN
TYPE: SURGICAL PAIN

## 2024-12-19 ASSESSMENT — PAIN SCALES - GENERAL: PAIN_LEVEL: 0

## 2024-12-19 NOTE — ANESTHESIA POSTPROCEDURE EVALUATION
Patient: Raysa Almanza    Procedure Summary       Date: 12/19/24 Room / Location:  ENDOSCOPIC ULTRASOUND ROOM / SURGERY [facility]    Anesthesia Start: 1247 Anesthesia Stop: 1418    Procedure: FIBER OPTIC BRONCHOSCOPY WITH POSSIBLE WASH, BRUSH, BRONCHOALVEOLAR LAVAGE, BIOPSY, FINE NEEDLE ASPIRATION AND NAVIGATION, ROBOTICS (Bronchus) Diagnosis: (ABNORMAL CT OF CHEST)    Surgeons: Orlando Bhakta M.D. Responsible Provider: Jessi Johnston M.D.    Anesthesia Type: general ASA Status: 2            Final Anesthesia Type: general  Last vitals  BP   Blood Pressure : 123/57    Temp   36.1 °C (97 °F)    Pulse   73   Resp   16    SpO2   98 %      Anesthesia Post Evaluation    Patient location during evaluation: PACU  Patient participation: complete - patient participated  Level of consciousness: awake and alert  Pain score: 0    Airway patency: patent  Anesthetic complications: no  Cardiovascular status: hemodynamically stable  Respiratory status: acceptable  Hydration status: euvolemic    PONV: none          There were no known notable events for this encounter.     Nurse Pain Score: 0 (NPRS)

## 2024-12-19 NOTE — ANESTHESIA PREPROCEDURE EVALUATION
Case: [id number] Date/Time: 12/19/24 1115    Procedure: FIBER OPTIC BRONCHOSCOPY WITH POSSIBLE WASH, BRUSH, BRONCHOALVEOLAR LAVAGE, BIOPSY, FINE NEEDLE ASPIRATION AND NAVIGATION, ROBOTICS    Pre-op diagnosis: ABNORMAL CT OF CHEST    Location:  ENDOSCOPIC ULTRASOUND ROOM / SURGERY [facility]    Surgeons: Orlando Bhakta M.D.            Relevant Problems   No relevant active problems       Physical Exam    Airway   Mallampati: II  TM distance: >3 FB  Neck ROM: full       Cardiovascular - normal exam  Rhythm: regular  Rate: normal  (-) murmur  Comments: EKG reviewed   Dental - normal exam  (+) upper dentures, lower dentures           Pulmonary - normal exam  Breath sounds clear to auscultation     Abdominal    Neurological - normal exam         Other findings: Hx of breast ca - had chemo many years ago               Anesthesia Plan    ASA 2       Plan - general       Airway plan will be ETT        Plan Factors:   Patient was previously instructed to abstain from smoking on day of procedure.  Patient did not smoke on day of procedure.      Induction: intravenous    Postoperative Plan: Postoperative administration of opioids is intended.    Pertinent diagnostic labs and testing reviewed    Informed Consent:    Anesthetic plan and risks discussed with patient.    Use of blood products discussed with: patient whom consented to blood products.

## 2024-12-19 NOTE — OR NURSING
1445: Report received from David GILBERT. Pt awake. Stable on RA. Denies pain and nausea.    1500: Pt resting comfortably. Updated pt family via phone.     1515: Pt remains stable on RA. Denies pain and nausea. Meets criteria to transfer to Stage 2

## 2024-12-19 NOTE — ANESTHESIA PROCEDURE NOTES
Airway    Date/Time: 12/19/2024 12:57 PM    Performed by: Jessi Johnston M.D.  Authorized by: Jessi Johnston M.D.    Location:  OR  Urgency:  Elective  Indications for Airway Management:  Anesthesia      Spontaneous Ventilation: absent    Sedation Level:  Deep  Preoxygenated: Yes    Patient Position:  Sniffing  Mask Difficulty Assessment:  1 - vent by mask  Final Airway Type:  Endotracheal airway  Final Endotracheal Airway:  ETT  Cuffed: Yes    Technique Used for Successful ETT Placement:  Direct laryngoscopy    Insertion Site:  Oral  Blade Type:  Jef  Laryngoscope Blade/Videolaryngoscope Blade Size:  3  ETT Size (mm):  8.0  Measured from:  Gums  ETT to Gums (cm):  20  Placement Verified by: auscultation and capnometry    Cormack-Lehane Classification:  Grade I - full view of glottis  Number of Attempts at Approach:  1  Number of Other Approaches Attempted:  0

## 2024-12-19 NOTE — FLOWSHEET NOTE
12/19/24 1245   Bronchoscopy Procedure   Bronchoscopy Procedure Yes   Order placed in Epic? Yes   Start Time 1305   End Time 1411   Performing Physician Dr Arrington   Procedure Monitoring Tech Time Bronchoscopy (60 Min)

## 2024-12-19 NOTE — OR NURSING
1413 Pt arrived from OR post Bronch, report received. Pt breathing spontaneous and unlabored.     1430 Pt  updated on progress, POC and ETA for DC    1445 Report to Frances

## 2024-12-19 NOTE — ANESTHESIA TIME REPORT
Anesthesia Start and Stop Event Times       Date Time Event    12/19/2024 11:57 AM Ready for Procedure    12/19/2024 12:47 PM Anesthesia Start    12/19/2024 02:18 PM Anesthesia Stop          Responsible Staff  12/19/24      Name Role Begin End    Jessi Johnston M.D. Anesthesiologist 12/19/24 12:47 PM 12/19/24 02:18 PM          Overtime Reason:  no overtime (within assigned shift)    Comments:

## 2024-12-19 NOTE — PROCEDURES
Fiberoptic Bronchoscopy/navigational     Date/Time of Procedure:12/19/2024     Indication(s): joshua and rll lesions hx of breast ca    Consent: Informed, written consent was obtained prior to the procedure; risks, benefits, & alternatives were discussed.    Universal Protocol/Time Out: A Time Out with checklist was performed prior to the procedure to ensure correct identification of the patient and procedure.    Pre-Procedure Diagnosis: r/o ca    Allergies:Ciprofloxacin, Nitrofurantoin, and Penicillins     Sedation/Analgesia/Anesthesia: Sedation as per anesthesia.    Additional Modalities:    [x] Fluoroscopy  [x] Radial Ultrasound  [x] Rapid On-Site Evaluation       Route of Entry:  [_] Nasal [_] Oral [X] ET tube [_] Trach [_] LMA      Findings: After the patient is adequately anesthetized (see procedure for anesthesia), the flexible bronchoscope was passed through the endotracheal tube visualizing the distal trachea:  Trachea: normal appearing lower third  [place]: sharp  Right lung: RUL, RML, and RLL all level one subsegments visualized. No endobronchial lesions. RLL post segment bal 40 cc inserted with 12 cc clear  Left lung: JOSHUA, Lingula and LLL , All level one subsegments visualized.     Episode of bradycardia and hypotension which was transient during procedure otherwise no acute complications    EBL < 2 cc    Robotic Navigational Bronchoscopy    Robotic navigation bronchoscopy was performed with Ion platform.   full registration was used.    Ion robotic catheter was used to engage the left upper apical segment segment of  Target lesion is about 2 cm in diameter.   Under navigational guidance the ion robotic catheter was advanced to 1.0 cm away from the planned target.     Radial EBUS was performed to confirm that the location of the nodule is eccentric.     Transbronchial needle aspiration was performed with 19 gauge needle through the extended working channel catheter.  Total 2 samples were collected.  Samples  sent for pathology.    Transbronchial biopsy was performed with ` forceps through the extended working channel catheter.  Total 5 samples were collected.  Samples sent for pathology       Bronchial alveolar lavage was performed the extended working channel catheter.  Instilled 20 cc of NS, suction returned with 17 cc of NS.     PEE findings: histiocytes       Specimens: _  [x] Bronchoalveolar Lavage (BAL): NAHOMY and RLL cx and cytology  [x] Transbronchial Needle Aspiration (TBNA): NAHOMY  [x] Transbronchial Biopsy (TBBx): NAHOMY    Impression and plan    Unclear NAHOMY and RLL chronic scar like lesions  No obvious malignancy  Follow up cultures and path  Cxr pending  DR. Gimenez will follow cxr  Will call pt with results

## 2024-12-19 NOTE — DISCHARGE INSTRUCTIONS
If any questions arise, call your provider.  If your provider is not available, please feel free to call the Surgical Center at [phone].    MEDICATIONS: Resume taking daily medication.  Take prescribed pain medication with food.  If no medication is prescribed, you may take non-aspirin pain medication if needed.  PAIN MEDICATION CAN BE VERY CONSTIPATING.  Take a stool softener or laxative such as senokot, pericolace, or milk of magnesia if needed.    Last pain medication given at     What to Expect Post Anesthesia    Rest and take it easy for the first 24 hours.  A responsible adult is recommended to remain with you during that time.  It is normal to feel sleepy.  We encourage you to not do anything that requires balance, judgment or coordination.    FOR 24 HOURS DO NOT:  Drive, operate machinery or run household appliances.  Drink beer or alcoholic beverages.  Make important decisions or sign legal documents.    To avoid nausea, slowly advance diet as tolerated, avoiding spicy or greasy foods for the first day.  Add more substantial food to your diet according to your provider's instructions.  Babies can be fed formula or breast milk as soon as they are hungry.  INCREASE FLUIDS AND FIBER TO AVOID CONSTIPATION.    MILD FLU-LIKE SYMPTOMS ARE NORMAL.  YOU MAY EXPERIENCE GENERALIZED MUSCLE ACHES, THROAT IRRITATION, HEADACHE AND/OR SOME NAUSEA.            Bronchoscopy Discharge Instructions  Home Care Instructions    ACTIVITY: Rest and take it easy for the first 24 hours.  A responsible adult is recommended to remain with you during that time.  It is normal to feel sleepy.  We encourage you to not do anything that requires balance, judgment or coordination.    The medicine you had during the bronchoscopy will make you sleepy.    FOR 24 HOURS DO NOT:  Drive, operate machinery or run household appliances.  Drink beer or alcoholic beverages.  Make important decisions or sign legal documents.  Engage in activity that  requires sharp judgment and reflexes for 24 hours    SPECIAL INSTRUCTIONS:     Bronchoscopy is a procedure to look inside your windpipe and bronchial tubes.  An anesthetic solution is sprayed in your throat to make it numb.    You may experience a mild sore throat, hoarseness, fever up to 101?F, and /or coughing up small amounts of blood immediately following your bronchoscopy, especially if a biopsy was performed.  The discomfort should subside in 24-48 hours.    Do not smoke for 6-8 hours after the procedure to decrease your risk of coughing and /or bleeding.    Do not drink fluids or eat until your gag reflex returns, for two hours after the bronchoscopy.  Otherwise you will not feel the food or fluid in your throat, and it may go down your windpipe and cause you to choke.    Take ice chips or slowly sip cool fluids to make sure your gag reflex has returned.  Avoid hot fluids from the microwave for several hours.    After 2 hours or when you get home you may take throat lozenges or gargle with salt water if your throat is sore.  Drink liquids to help dryness in your mouth and throat.    Resume your normal activities the following day.    MEDICATIONS: Resume taking daily medication as directed by your doctor.    A follow-up appointment should be arranged with your doctor in 1 week to get the results of the bronchoscopy and any tests done during the procedure; call to schedule.    You should CALL YOUR PHYSICIAN if you develop:  Fever greater than 101?F  You cough up more than a teaspoon of blood other than blood-tinged mucus  You have increasing amounts of bleeding from coughing after the bronchoscopy  You are wheezing  You develop any unusual signs or symptoms or have any questions                You should call 911 if you develop problems with breathing or chest pain.    If you are unable to contact your doctor or surgical center, you should go to the nearest emergency room or urgent care center.       If any  questions arise, call your doctor.  If your doctor is not available, please feel free to call the Surgical Center at [phone].  The Center is open Monday through Friday from 7AM to 7PM.  You can also call the [employer] HOTLINE open 24 hours/day, 7 days/week and speak to a nurse at [phone], or toll free at [phone].    You may receive a survey in the mail within the next two weeks and we ask that you take a few moments to complete the survey and return it to us.  Our goal is to provide you with very good care and we value your comments.

## 2024-12-20 ENCOUNTER — RESEARCH ENCOUNTER (OUTPATIENT)
Facility: MEDICAL CENTER | Age: 84
End: 2024-12-20
Payer: MEDICARE

## 2024-12-20 NOTE — RESEARCH NOTE
"ION CONSENT NOTE:    Participation in the ION (A Prospective, Data Collection Study Utilizing a Fiducial Board for use With the Ion Endoluminal System) clinical trial was discussed with patient today. All aspects of the study purpose and procedures were explained. Subject was given ample time to review the consent and all questions were answered to their satisfaction. Patient is aware that the clinical trial is voluntary and subject may withdraw consent at any time without affecting the level of care they receive. Subject signed consent without coercion and undue influence and was given a copy of the signed consent. No study-related procedures took place prior to consenting and all assessments were conducted per protocol.     Inclusion Criteria   Criteria must be met at the time of enrollment   (if any response below is \"No,\" subject should be excluded from the study)     1.Subject is 18 years or older Yes  2. Subject is undergoing a planned lung biopsy procedure with the Ion Endoluminal System Yes  3. Subject is willing to participate as demonstrated by signing the informed consent Yes    Exclusion criteria:  (if any response below is \"Yes,\" subject should be excluded from the study)     Subject is unable to provide informed consent No  2. Subject cannot safely tolerate a physician directed 30 second breath hold No  3. Subject is pregnant or breast-feeding No    CHECK POINTS:  Procedure Date: 12-  Procedure Start time: 12:47  Investigator performing procedure: Dr. Bhakta    Study Exit Date: 12-    Was the Fluoro sweep with Fiducial Board and Breath hold completed?  Yes    (If response is \"Yes\" please enter reasoning)    Were there any A.E.'s reported during procedure?  No    Were there any protocol deviations?  No    Was there any reported Board malfunction?  No         "

## 2024-12-20 NOTE — OR NURSING
1540- Patient arrived to phase 2. Patient changed out of surgical gown into clothes. Patient is A&Ox4. Patient reports no pain and no nausea. Vital signs taken and patient assessed. Asked the patient if they had to use the bathroom. The patient declined.    1600- Discharge instructions read. All questions answered.    1619-IV removed. CNA transported the patient to her ride via wheelchair. Discharged to care of responsible adult. All belongings with the patient.

## 2024-12-21 LAB
BACTERIA SPEC RESP CULT: NORMAL
BACTERIA SPEC RESP CULT: NORMAL
GRAM STN SPEC: NORMAL
GRAM STN SPEC: NORMAL
SIGNIFICANT IND 70042: NORMAL
SIGNIFICANT IND 70042: NORMAL
SITE SITE: NORMAL
SITE SITE: NORMAL
SOURCE SOURCE: NORMAL
SOURCE SOURCE: NORMAL

## 2024-12-24 ENCOUNTER — TELEPHONE (OUTPATIENT)
Dept: SLEEP MEDICINE | Facility: MEDICAL CENTER | Age: 84
End: 2024-12-24
Payer: MEDICARE

## 2024-12-25 NOTE — TELEPHONE ENCOUNTER
Bronchoscopy results    Called pt to review results    FINAL DIAGNOSIS:     A. Left upper lobe core:          Predominantly blood with scant admixed fragments of benign           bronchial epithelium.     B. Touch prep slides and biopsy, left upper lobe:          Scant fragments of benign alveolated pulmonary parenchyma and           benign bronchial tissue with fibrosis, negative for malignancy.            No granulomas are seen.          Negative for amyloid deposition by Congo red special stain.     C. Bronchoalveolar lavage, left upper lobe:          No malignant cells seen.          Reactive bronchial epithelial cells, macrophages, chronic           inflammation, and mucoid debris.     D. Bronchoalveolar lavage, right lower lobe:          No malignant cells seen.          Reactive bronchial epithelial cells, macrophages, chronic           inflammation, and mucoid debris.       Pt has PET scan scheduled 12/31/2024    Left voicemail at 4 :07 pm 12/24/2024

## 2024-12-26 ENCOUNTER — TELEPHONE (OUTPATIENT)
Dept: SLEEP MEDICINE | Facility: MEDICAL CENTER | Age: 84
End: 2024-12-26
Payer: MEDICARE

## 2024-12-26 NOTE — TELEPHONE ENCOUNTER
Called pt to review results of bronchoscopy    FINAL DIAGNOSIS:     A. Left upper lobe core:          Predominantly blood with scant admixed fragments of benign           bronchial epithelium.     B. Touch prep slides and biopsy, left upper lobe:          Scant fragments of benign alveolated pulmonary parenchyma and           benign bronchial tissue with fibrosis, negative for malignancy.            No granulomas are seen.          Negative for amyloid deposition by [place] red special stain.     C. Bronchoalveolar lavage, left upper lobe:          No malignant cells seen.          Reactive bronchial epithelial cells, macrophages, chronic           inflammation, and mucoid debris.     D. Bronchoalveolar lavage, right lower lobe:          No malignant cells seen.          Reactive bronchial epithelial cells, macrophages, chronic           inflammation, and mucoid debris.       Pt cancelled her PET scan understanding that this could still be a malignancy  Wants to follow up in 6 months with a CT scan and will follow up with JOSEFINA Garcia her PCP.

## 2025-02-27 ENCOUNTER — RESEARCH ENCOUNTER (OUTPATIENT)
Dept: RESEARCH | Facility: MEDICAL CENTER | Age: 85
End: 2025-02-27

## 2025-02-27 DIAGNOSIS — Z00.6 RESEARCH STUDY PATIENT: Primary | ICD-10-CM

## 2025-02-27 DIAGNOSIS — Z00.6 CLINICAL TRIAL PARTICIPANT: ICD-10-CM

## (undated) DEVICE — SET I.V. EXTENSION DIAL-A- - FLOW REGULATOR (48EA/CA)

## (undated) DEVICE — FORCEPS BRONCH DISPOSABLE ALLIGATOR JAW (20EA/BX)

## (undated) DEVICE — PROBE ENDOSCOPIC PERIPHERAL VISION ION 1.5 IF1000 (1/EA)

## (undated) DEVICE — TOWEL STOP TIMEOUT SAFETY FLAG (40EA/CA)

## (undated) DEVICE — CANNULA O2 COMFORT SOFT EAR ADULT 7 FT TUBING (50/CA)

## (undated) DEVICE — CATHETER GUIDING ION (1/EA)

## (undated) DEVICE — BRONCHOSCOPE EXALT MODEL B REGULAR (10EA/BX)

## (undated) DEVICE — BAG IV VISION ION IF1000 (10EA/BX)

## (undated) DEVICE — NEEDLE BIOPSY FLEXISION OD19 GA IF1000 (5EA/BX)